# Patient Record
Sex: FEMALE | Race: WHITE | URBAN - METROPOLITAN AREA
[De-identification: names, ages, dates, MRNs, and addresses within clinical notes are randomized per-mention and may not be internally consistent; named-entity substitution may affect disease eponyms.]

---

## 2018-02-02 ENCOUNTER — IMPORTED ENCOUNTER (OUTPATIENT)
Dept: URBAN - METROPOLITAN AREA CLINIC 50 | Facility: CLINIC | Age: 73
End: 2018-02-02

## 2018-06-26 ENCOUNTER — IMPORTED ENCOUNTER (OUTPATIENT)
Dept: URBAN - METROPOLITAN AREA CLINIC 50 | Facility: CLINIC | Age: 73
End: 2018-06-26

## 2018-08-16 ENCOUNTER — IMPORTED ENCOUNTER (OUTPATIENT)
Dept: URBAN - METROPOLITAN AREA CLINIC 50 | Facility: CLINIC | Age: 73
End: 2018-08-16

## 2018-11-27 ENCOUNTER — IMPORTED ENCOUNTER (OUTPATIENT)
Dept: URBAN - METROPOLITAN AREA CLINIC 50 | Facility: CLINIC | Age: 73
End: 2018-11-27

## 2018-12-13 ENCOUNTER — IMPORTED ENCOUNTER (OUTPATIENT)
Dept: URBAN - METROPOLITAN AREA CLINIC 50 | Facility: CLINIC | Age: 73
End: 2018-12-13

## 2019-05-13 ENCOUNTER — IMPORTED ENCOUNTER (OUTPATIENT)
Dept: URBAN - METROPOLITAN AREA CLINIC 50 | Facility: CLINIC | Age: 74
End: 2019-05-13

## 2019-06-13 ENCOUNTER — IMPORTED ENCOUNTER (OUTPATIENT)
Dept: URBAN - METROPOLITAN AREA CLINIC 50 | Facility: CLINIC | Age: 74
End: 2019-06-13

## 2019-06-13 NOTE — PATIENT DISCUSSION
"""Informed patient that their capsular opacification is not visually significant or does not meet the minimum criteria for capsulotomy.  Recommended attention to PCO symptoms Normal vision: sees adequately in most situations; can see medication labels, newsprint

## 2020-01-02 ENCOUNTER — IMPORTED ENCOUNTER (OUTPATIENT)
Dept: URBAN - METROPOLITAN AREA CLINIC 50 | Facility: CLINIC | Age: 75
End: 2020-01-02

## 2020-07-02 ENCOUNTER — IMPORTED ENCOUNTER (OUTPATIENT)
Dept: URBAN - METROPOLITAN AREA CLINIC 50 | Facility: CLINIC | Age: 75
End: 2020-07-02

## 2020-08-12 ENCOUNTER — IMPORTED ENCOUNTER (OUTPATIENT)
Dept: URBAN - METROPOLITAN AREA CLINIC 50 | Facility: CLINIC | Age: 75
End: 2020-08-12

## 2020-08-25 ENCOUNTER — IMPORTED ENCOUNTER (OUTPATIENT)
Dept: URBAN - METROPOLITAN AREA CLINIC 50 | Facility: CLINIC | Age: 75
End: 2020-08-25

## 2021-04-18 ASSESSMENT — TONOMETRY
OD_IOP_MMHG: 18
OS_IOP_MMHG: 20
OD_IOP_MMHG: 17
OS_IOP_MMHG: 15
OS_IOP_MMHG: 19
OS_IOP_MMHG: 15
OS_IOP_MMHG: 17
OD_IOP_MMHG: 19
OD_IOP_MMHG: 15
OD_IOP_MMHG: 16
OS_IOP_MMHG: 17
OD_IOP_MMHG: 19

## 2021-04-18 ASSESSMENT — VISUAL ACUITY
OD_SC: 20/25
OS_BAT: 20/20-
OS_CC: J1+
OS_OTHER: 20/20-. 20/30.
OS_CC: J2
OD_OTHER: 20/25-. 20/30.
OD_CC: J1+
OD_CC: J2
OD_SC: 20/25-2
OD_CC: J1+
OD_CC: J1-2
OS_BAT: 20/20-
OS_SC: 20/25
OD_SC: 20/20-1
OS_SC: 20/25-2
OS_SC: 20/25-2
OS_SC: 20/20
OD_SC: 20/25+2
OS_SC: 20/30
OS_OTHER: 20/20-. 20/30.
OS_SC: 20/25-2
OD_SC: 20/25-2
OS_CC: J1-2
OD_BAT: 20/25-
OD_BAT: 20/25-
OD_OTHER: 20/25-. 20/30.
OD_SC: 20/20-2
OS_CC: J1+

## 2022-02-17 ENCOUNTER — PREPPED CHART (OUTPATIENT)
Dept: URBAN - METROPOLITAN AREA CLINIC 52 | Facility: CLINIC | Age: 77
End: 2022-02-17

## 2022-02-23 ENCOUNTER — COMPREHENSIVE EXAM (OUTPATIENT)
Dept: URBAN - METROPOLITAN AREA CLINIC 52 | Facility: CLINIC | Age: 77
End: 2022-02-23

## 2022-02-23 DIAGNOSIS — H26.491: ICD-10-CM

## 2022-02-23 DIAGNOSIS — H35.3131: ICD-10-CM

## 2022-02-23 PROCEDURE — 92134 CPTRZ OPH DX IMG PST SGM RTA: CPT

## 2022-02-23 PROCEDURE — 92014 COMPRE OPH EXAM EST PT 1/>: CPT

## 2022-02-23 ASSESSMENT — VISUAL ACUITY
OD_GLARE: 20/30
OD_SC: 20/25-2
OS_SC: 20/25-1
OD_GLARE: 20/50

## 2022-02-23 ASSESSMENT — TONOMETRY
OD_IOP_MMHG: 16
OS_IOP_MMHG: 16

## 2022-02-23 NOTE — PATIENT DISCUSSION
PCO ([address]): Visually significant PCO present on exam today. Recommend YAG laser capsulotomy to improve vision and decrease glare symptoms. RBAs of procedure discussed. Patient agrees and wishes to proceed.

## 2022-03-03 ENCOUNTER — CLINIC PROCEDURE ONLY (OUTPATIENT)
Dept: URBAN - METROPOLITAN AREA CLINIC 52 | Facility: CLINIC | Age: 77
End: 2022-03-03

## 2022-03-03 DIAGNOSIS — H26.491: ICD-10-CM

## 2022-03-03 PROCEDURE — 66821 AFTER CATARACT LASER SURGERY: CPT

## 2022-03-03 RX ORDER — PREDNISOLONE ACETATE 10 MG/ML: 1 SUSPENSION/ DROPS OPHTHALMIC TWICE A DAY

## 2022-03-03 ASSESSMENT — VISUAL ACUITY
OS_SC: 20/25-2
OD_SC: 20/30+1
OD_PH: 20/25

## 2022-03-03 ASSESSMENT — TONOMETRY
OS_IOP_MMHG: 16
OD_IOP_MMHG: 17

## 2022-03-03 NOTE — PROCEDURE NOTE: CLINICAL
PROCEDURE NOTE: YAG Capsulotomy OD. Diagnosis: Posterior Capsular Opacification (PCO). Prep: Mydriacil 1% and Phenylephrine 2.5%. Prior to treatment, the risks/benefits/alternatives were discussed. The patient wished to proceed with procedure. Consent was signed. Proparacaine and brimonidine were placed into the operative eye after the eye was dilated. Power = *mJ. Number of pulses = *. Patient tolerated procedure well and there were no complications. Post Laser instructions given. Kenyatta Ahn

## 2022-03-03 NOTE — PATIENT DISCUSSION
PCO ([address]): Visually significant PCO present on exam today. Recommend YAG laser capsulotomy to improve vision and decrease glare symptoms. RBAs of procedure discussed. Patient agrees and wishes to proceed.

## 2022-03-30 ENCOUNTER — POST-OP (OUTPATIENT)
Dept: URBAN - METROPOLITAN AREA CLINIC 52 | Facility: CLINIC | Age: 77
End: 2022-03-30

## 2022-03-30 DIAGNOSIS — Z98.890: ICD-10-CM

## 2022-03-30 PROCEDURE — 92015 DETERMINE REFRACTIVE STATE: CPT

## 2022-03-30 PROCEDURE — 99024 POSTOP FOLLOW-UP VISIT: CPT

## 2022-03-30 ASSESSMENT — VISUAL ACUITY
OD_CC: J1 @ 14IN
OU_CC: J1 @ 14IN
OD_PH: 20/25
OS_CC: J1+ @ 14IN
OS_SC: 20/25
OU_SC: 20/20
OD_SC: 20/30-1

## 2022-03-30 ASSESSMENT — KERATOMETRY
OD_K2POWER_DIOPTERS: 43.50
OD_K1POWER_DIOPTERS: 43.50
OS_AXISANGLE_DEGREES: 0
OD_AXISANGLE_DEGREES: 0
OS_K2POWER_DIOPTERS: 44.00
OS_AXISANGLE2_DEGREES: 90
OS_K1POWER_DIOPTERS: 44.00
OD_AXISANGLE2_DEGREES: 90

## 2022-03-30 ASSESSMENT — TONOMETRY
OS_IOP_MMHG: 14
OD_IOP_MMHG: 14

## 2022-03-30 NOTE — PATIENT DISCUSSION
Advised to call immediately if any worsening distortion or blurring is noted.
Discussed AREDS2 supplements, BP Control, UV protection and dark leafy green vegetables.
Final Glasses Rx given to patient.
Mac OCT performed: stable with no fluid noted today.
Patient is doing well after recent procedure.
Recommended artificial tears to use as directed.
Recommended observation.
Retinal tear and detachment warning symptoms reviewed and patient instructed to call immediately if increasing floaters, flashes, or decreasing peripheral vision.
S/P Yag Cap OS 7/2020.
See Dx 5.
no

## 2022-09-21 ENCOUNTER — ESTABLISHED PATIENT (OUTPATIENT)
Dept: URBAN - METROPOLITAN AREA CLINIC 52 | Facility: CLINIC | Age: 77
End: 2022-09-21

## 2022-09-21 DIAGNOSIS — H43.812: ICD-10-CM

## 2022-09-21 DIAGNOSIS — Z96.1: ICD-10-CM

## 2022-09-21 DIAGNOSIS — H04.123: ICD-10-CM

## 2022-09-21 DIAGNOSIS — H35.3131: ICD-10-CM

## 2022-09-21 PROCEDURE — 92134 CPTRZ OPH DX IMG PST SGM RTA: CPT

## 2022-09-21 PROCEDURE — 92015 DETERMINE REFRACTIVE STATE: CPT

## 2022-09-21 PROCEDURE — 92014 COMPRE OPH EXAM EST PT 1/>: CPT

## 2022-09-21 PROCEDURE — 68761 CLOSE TEAR DUCT OPENING: CPT

## 2022-09-21 ASSESSMENT — TONOMETRY
OD_IOP_MMHG: 14
OS_IOP_MMHG: 14

## 2022-09-21 ASSESSMENT — VISUAL ACUITY
OS_GLARE: 20/25-2
OS_GLARE: 20/20
OU_CC: J1@14IN
OD_GLARE: 20/25
OD_PH: 20/25+2
OS_SC: 20/25-1
OD_GLARE: 20/20
OD_SC: 20/30-1
OU_SC: 20/20-2

## 2022-09-21 NOTE — PROCEDURE NOTE: CLINICAL
PROCEDURE NOTE: Punctal Plugs OS. Diagnosis: Dry Eye Syndrome. Informed consent was obtained. Size/location of plugs inserted: 0.5mm/OS. Patient tolerated procedure without complication or difficulty. Lizzy Hopkins

## 2022-09-21 NOTE — PATIENT DISCUSSION
PP OS placed today. 0.5mm Exp 12/24/2023 Lot [id number]. Will see patient back in 2 weeks for follow up.

## 2022-09-21 NOTE — PATIENT DISCUSSION
Discussed AREDS2 supplements, UV protection and dark leafy green vegetables. Provided patient sample of AREDS2.

## 2023-08-23 ENCOUNTER — COMPREHENSIVE EXAM (OUTPATIENT)
Dept: URBAN - METROPOLITAN AREA CLINIC 52 | Facility: CLINIC | Age: 78
End: 2023-08-23

## 2023-08-23 DIAGNOSIS — H04.123: ICD-10-CM

## 2023-08-23 DIAGNOSIS — H16.223: ICD-10-CM

## 2023-08-23 DIAGNOSIS — H43.812: ICD-10-CM

## 2023-08-23 DIAGNOSIS — H35.3131: ICD-10-CM

## 2023-08-23 PROCEDURE — 92014 COMPRE OPH EXAM EST PT 1/>: CPT

## 2023-08-23 PROCEDURE — 92134 CPTRZ OPH DX IMG PST SGM RTA: CPT

## 2023-08-23 RX ORDER — LIFITEGRAST 50 MG/ML
1 SOLUTION/ DROPS OPHTHALMIC TWICE A DAY
Start: 2023-08-23

## 2023-08-23 ASSESSMENT — VISUAL ACUITY
OU_SC: 20/20
OS_SC: 20/20
OD_SC: 20/25

## 2023-08-23 ASSESSMENT — TONOMETRY
OD_IOP_MMHG: 15
OS_IOP_MMHG: 16

## 2024-06-18 ENCOUNTER — FOLLOW UP (OUTPATIENT)
Dept: URBAN - METROPOLITAN AREA CLINIC 52 | Facility: CLINIC | Age: 79
End: 2024-06-18

## 2024-06-18 DIAGNOSIS — H35.3131: ICD-10-CM

## 2024-06-18 DIAGNOSIS — D23.112: ICD-10-CM

## 2024-06-18 DIAGNOSIS — H43.812: ICD-10-CM

## 2024-06-18 DIAGNOSIS — H04.123: ICD-10-CM

## 2024-06-18 DIAGNOSIS — H16.223: ICD-10-CM

## 2024-06-18 DIAGNOSIS — D23.122: ICD-10-CM

## 2024-06-18 PROCEDURE — 92134 CPTRZ OPH DX IMG PST SGM RTA: CPT

## 2024-06-18 PROCEDURE — 99214 OFFICE O/P EST MOD 30 MIN: CPT

## 2024-06-18 RX ORDER — VARENICLINE 0.03 MG/.05ML
1 SPRAY NASAL
Start: 2024-06-18

## 2024-06-18 ASSESSMENT — TONOMETRY
OD_IOP_MMHG: 15
OS_IOP_MMHG: 15

## 2024-06-18 ASSESSMENT — VISUAL ACUITY
OD_SC: 20/25
OU_SC: 20/25
OS_SC: 20/25

## 2024-09-12 ENCOUNTER — EMERGENCY VISIT (OUTPATIENT)
Dept: URBAN - METROPOLITAN AREA CLINIC 52 | Facility: CLINIC | Age: 79
End: 2024-09-12

## 2024-09-12 VITALS — HEART RATE: 77 BPM | SYSTOLIC BLOOD PRESSURE: 124 MMHG | HEIGHT: 55 IN | DIASTOLIC BLOOD PRESSURE: 74 MMHG

## 2024-09-12 DIAGNOSIS — H04.123: ICD-10-CM

## 2024-09-12 PROCEDURE — 92012 INTRM OPH EXAM EST PATIENT: CPT

## 2024-09-12 RX ORDER — FLUOROMETHOLONE 1 MG/ML
1 SUSPENSION/ DROPS OPHTHALMIC TWICE A DAY
Start: 2024-09-12

## 2024-11-07 ENCOUNTER — FOLLOW UP (OUTPATIENT)
Dept: URBAN - METROPOLITAN AREA CLINIC 52 | Facility: CLINIC | Age: 79
End: 2024-11-07

## 2024-11-07 DIAGNOSIS — H04.123: ICD-10-CM

## 2024-11-07 PROCEDURE — 99213 OFFICE O/P EST LOW 20 MIN: CPT

## 2024-11-07 PROCEDURE — 68761 CLOSE TEAR DUCT OPENING: CPT

## 2025-04-16 ENCOUNTER — COMPREHENSIVE EXAM (OUTPATIENT)
Age: 80
End: 2025-04-16

## 2025-04-16 DIAGNOSIS — H43.812: ICD-10-CM

## 2025-04-16 DIAGNOSIS — H35.3131: ICD-10-CM

## 2025-04-16 DIAGNOSIS — H04.123: ICD-10-CM

## 2025-04-16 DIAGNOSIS — H02.834: ICD-10-CM

## 2025-04-16 DIAGNOSIS — H02.831: ICD-10-CM

## 2025-04-16 PROCEDURE — 92134 CPTRZ OPH DX IMG PST SGM RTA: CPT

## 2025-04-16 PROCEDURE — 99214 OFFICE O/P EST MOD 30 MIN: CPT

## 2025-04-16 RX ORDER — B-COMPLEX WITH VITAMIN C
CAPSULE ORAL TWICE A DAY
Start: 2025-04-16